# Patient Record
Sex: MALE | HISPANIC OR LATINO | URBAN - METROPOLITAN AREA
[De-identification: names, ages, dates, MRNs, and addresses within clinical notes are randomized per-mention and may not be internally consistent; named-entity substitution may affect disease eponyms.]

---

## 2018-10-01 ENCOUNTER — NEW PATIENT (OUTPATIENT)
Dept: URBAN - METROPOLITAN AREA CLINIC 60 | Facility: CLINIC | Age: 53
End: 2018-10-01
Payer: COMMERCIAL

## 2018-10-01 DIAGNOSIS — H52.4 PRESBYOPIA: ICD-10-CM

## 2018-10-01 DIAGNOSIS — H40.013 OPEN ANGLE WITH BORDERLINE FINDINGS, LOW RISK, BILATERAL: ICD-10-CM

## 2018-10-01 DIAGNOSIS — H25.89 OTHER AGE-RELATED CATARACT: Primary | ICD-10-CM

## 2018-10-01 PROCEDURE — 92004 COMPRE OPH EXAM NEW PT 1/>: CPT | Performed by: OPHTHALMOLOGY

## 2018-10-01 PROCEDURE — 92133 CPTRZD OPH DX IMG PST SGM ON: CPT | Performed by: OPHTHALMOLOGY

## 2018-10-01 ASSESSMENT — VISUAL ACUITY
OD: 20/25
OS: 20/20

## 2018-10-01 ASSESSMENT — KERATOMETRY
OD: 42.05
OS: 42.06

## 2018-10-01 ASSESSMENT — INTRAOCULAR PRESSURE
OS: 13
OD: 12

## 2019-02-01 ENCOUNTER — FOLLOW UP ESTABLISHED (OUTPATIENT)
Dept: URBAN - METROPOLITAN AREA CLINIC 60 | Facility: CLINIC | Age: 54
End: 2019-02-01
Payer: COMMERCIAL

## 2019-02-01 DIAGNOSIS — H25.813 COMBINED FORMS OF AGE-RELATED CATARACT, BILATERAL: ICD-10-CM

## 2019-02-01 PROCEDURE — 92250 FUNDUS PHOTOGRAPHY W/I&R: CPT | Performed by: OPHTHALMOLOGY

## 2019-02-01 PROCEDURE — 76514 ECHO EXAM OF EYE THICKNESS: CPT | Performed by: OPHTHALMOLOGY

## 2019-02-01 PROCEDURE — 92014 COMPRE OPH EXAM EST PT 1/>: CPT | Performed by: OPHTHALMOLOGY

## 2019-02-01 PROCEDURE — 92083 EXTENDED VISUAL FIELD XM: CPT | Performed by: OPHTHALMOLOGY

## 2019-02-01 ASSESSMENT — VISUAL ACUITY
OD: 20/30
OS: 20/30

## 2019-02-01 ASSESSMENT — INTRAOCULAR PRESSURE
OD: 12
OS: 11

## 2021-05-03 ENCOUNTER — OFFICE VISIT (OUTPATIENT)
Dept: URBAN - METROPOLITAN AREA CLINIC 60 | Facility: CLINIC | Age: 56
End: 2021-05-03
Payer: COMMERCIAL

## 2021-05-03 DIAGNOSIS — H40.023 OPEN ANGLE WITH BORDERLINE FINDINGS, HIGH RISK, BILATERAL: ICD-10-CM

## 2021-05-03 DIAGNOSIS — H52.03 HYPERMETROPIA, BILATERAL: ICD-10-CM

## 2021-05-03 PROCEDURE — 92004 COMPRE OPH EXAM NEW PT 1/>: CPT | Performed by: OPTOMETRIST

## 2021-05-03 PROCEDURE — 92133 CPTRZD OPH DX IMG PST SGM ON: CPT | Performed by: OPTOMETRIST

## 2021-05-03 ASSESSMENT — VISUAL ACUITY
OS: 20/25
OD: 20/20

## 2021-05-03 ASSESSMENT — INTRAOCULAR PRESSURE
OD: 18
OS: 18

## 2021-05-03 NOTE — IMPRESSION/PLAN
Impression: Open angle with borderline findings, high risk, bilateral: H40.023. Plan: Patient educated on findings. Reviewed today's OCT(RNFL) and last testing done with patient. Will not start patient on drop treatment at this time. Patient to return in 6 months for a IOP check and visual field.